# Patient Record
Sex: FEMALE | Race: WHITE | NOT HISPANIC OR LATINO | URBAN - METROPOLITAN AREA
[De-identification: names, ages, dates, MRNs, and addresses within clinical notes are randomized per-mention and may not be internally consistent; named-entity substitution may affect disease eponyms.]

---

## 2020-09-29 ENCOUNTER — CLAIMS CREATED FROM THE CLAIM WINDOW (OUTPATIENT)
Dept: URBAN - METROPOLITAN AREA CLINIC 118 | Facility: CLINIC | Age: 50
End: 2020-09-29
Payer: COMMERCIAL

## 2020-09-29 ENCOUNTER — LAB OUTSIDE AN ENCOUNTER (OUTPATIENT)
Dept: URBAN - METROPOLITAN AREA CLINIC 118 | Facility: CLINIC | Age: 50
End: 2020-09-29

## 2020-09-29 DIAGNOSIS — Z12.11 COLON CANCER SCREENING: ICD-10-CM

## 2020-09-29 DIAGNOSIS — K64.4 EXTERNAL HEMORRHOID: ICD-10-CM

## 2020-09-29 PROCEDURE — 99202 OFFICE O/P NEW SF 15 MIN: CPT | Performed by: INTERNAL MEDICINE

## 2020-09-29 PROCEDURE — G9903 PT SCRN TBCO ID AS NON USER: HCPCS | Performed by: INTERNAL MEDICINE

## 2020-09-29 PROCEDURE — 1036F TOBACCO NON-USER: CPT | Performed by: INTERNAL MEDICINE

## 2020-09-29 PROCEDURE — G8420 CALC BMI NORM PARAMETERS: HCPCS | Performed by: INTERNAL MEDICINE

## 2020-09-29 PROCEDURE — G8427 DOCREV CUR MEDS BY ELIG CLIN: HCPCS | Performed by: INTERNAL MEDICINE

## 2020-09-29 PROCEDURE — 3017F COLORECTAL CA SCREEN DOC REV: CPT | Performed by: INTERNAL MEDICINE

## 2020-09-29 RX ORDER — PHENTERMINE HYDROCHLORIDE 37.5 MG/1
CAPSULE ORAL
Qty: 0 | Refills: 0 | Status: ACTIVE | COMMUNITY
Start: 1900-01-01

## 2020-09-29 NOTE — HPI-TODAY'S VISIT:
this is a 48 yo female with h/o external hemorrhoids previously seen Dr. Goode last year.  Reports she had another flare up of hemorrhoids with rectal pain and itching in March and in the summer. Both times, she did sitz bath, topical treatments and resolved. Her stools are soft now and regular on fiber supplements.  She is maxx for colonoscopy evaluation.

## 2020-10-12 ENCOUNTER — OFFICE VISIT (OUTPATIENT)
Dept: URBAN - METROPOLITAN AREA CLINIC 118 | Facility: CLINIC | Age: 50
End: 2020-10-12

## 2020-10-21 ENCOUNTER — OFFICE VISIT (OUTPATIENT)
Dept: URBAN - METROPOLITAN AREA SURGERY CENTER 23 | Facility: SURGERY CENTER | Age: 50
End: 2020-10-21
Payer: COMMERCIAL

## 2020-10-21 DIAGNOSIS — Z12.11 COLON CANCER SCREENING: ICD-10-CM

## 2020-10-21 PROCEDURE — G9935 CANC NOT DETECTD DURING SRCN: HCPCS | Performed by: INTERNAL MEDICINE

## 2020-10-21 PROCEDURE — G8907 PT DOC NO EVENTS ON DISCHARG: HCPCS | Performed by: INTERNAL MEDICINE

## 2020-10-21 PROCEDURE — 45378 DIAGNOSTIC COLONOSCOPY: CPT | Performed by: INTERNAL MEDICINE

## 2020-10-21 RX ORDER — PHENTERMINE HYDROCHLORIDE 37.5 MG/1
CAPSULE ORAL
Qty: 0 | Refills: 0 | Status: ACTIVE | COMMUNITY
Start: 1900-01-01

## 2023-05-18 ENCOUNTER — LAB OUTSIDE AN ENCOUNTER (OUTPATIENT)
Dept: URBAN - METROPOLITAN AREA CLINIC 70 | Facility: CLINIC | Age: 53
End: 2023-05-18

## 2023-05-18 ENCOUNTER — WEB ENCOUNTER (OUTPATIENT)
Dept: URBAN - METROPOLITAN AREA CLINIC 70 | Facility: CLINIC | Age: 53
End: 2023-05-18

## 2023-05-18 ENCOUNTER — CLAIMS CREATED FROM THE CLAIM WINDOW (OUTPATIENT)
Dept: URBAN - METROPOLITAN AREA CLINIC 70 | Facility: CLINIC | Age: 53
End: 2023-05-18
Payer: COMMERCIAL

## 2023-05-18 VITALS
BODY MASS INDEX: 25.22 KG/M2 | TEMPERATURE: 97.6 F | SYSTOLIC BLOOD PRESSURE: 125 MMHG | HEART RATE: 91 BPM | WEIGHT: 151.4 LBS | HEIGHT: 65 IN | DIASTOLIC BLOOD PRESSURE: 82 MMHG

## 2023-05-18 DIAGNOSIS — R10.13 EPIGASTRIC PAIN: ICD-10-CM

## 2023-05-18 DIAGNOSIS — Z12.11 COLON CANCER SCREENING: ICD-10-CM

## 2023-05-18 DIAGNOSIS — K58.2 IRRITABLE BOWEL SYNDROME WITH ALTERNATING BOWEL HABITS: ICD-10-CM

## 2023-05-18 DIAGNOSIS — R10.84 GENERALIZED ABDOMINAL PAIN: ICD-10-CM

## 2023-05-18 PROBLEM — 440544005: Status: ACTIVE | Noted: 2023-05-18

## 2023-05-18 PROCEDURE — 99214 OFFICE O/P EST MOD 30 MIN: CPT | Performed by: NURSE PRACTITIONER

## 2023-05-18 PROCEDURE — 99214 OFFICE O/P EST MOD 30 MIN: CPT | Performed by: INTERNAL MEDICINE

## 2023-05-18 RX ORDER — OMEPRAZOLE MAGNESIUM 10 MG/1
1 PACKET MIXED WITH 15 ML OF WATER GRANULE, DELAYED RELEASE ORAL ONCE A DAY
Status: ACTIVE | COMMUNITY

## 2023-05-18 RX ORDER — PHENTERMINE HYDROCHLORIDE 37.5 MG/1
CAPSULE ORAL
Qty: 0 | Refills: 0 | Status: ACTIVE | COMMUNITY
Start: 1900-01-01

## 2023-05-18 RX ORDER — NICOTINE 14MG/24HR
AS DIRECTED PATCH, TRANSDERMAL 24 HOURS TRANSDERMAL
Status: ACTIVE | COMMUNITY

## 2023-05-18 NOTE — HPI-TODAY'S VISIT:
OV 9/29/20 Dr. Cruz: this is a 48 yo female with h/o external hemorrhoids previously seen Dr. Goode last year.  Reports she had another flare up of hemorrhoids with rectal pain and itching in March and in the summer. Both times, she did sitz bath, topical treatments and resolved. Her stools are soft now and regular on fiber supplements.  She is maxx for colonoscopy evaluation. -------------------------------------------------- Today 5/18/23: Patient presents today with c/o multiple chronic GI symptoms. She reports chronic alternating bowel habits with previously going 1-2 days without a BM and once she had a BM with hard stool she would have multipl BMs to follow with loose stool. Currently she reports having BMs x 5-6/day with loose non-bloody stool. Has associated chronic generalized abdominal discomfort. She also reports epigastric pain that is worse after eating. Takes OTC prilosec that seems to help. No NSAIDs. Uses marijuana alternative (Delta 8). No prior EGD. No alcohol use. Is s/p CCY. Mother with IBD (crohn's). Had a colonoscopy 10/21/20 with findings of hemorrhoids and tortuous colon otherwise normal. No recent antibiotics, travel, or ill contacts. Denies fever, wt loss, N/V, dysphagia, or signs of GI bleeding.

## 2023-05-19 ENCOUNTER — TELEPHONE ENCOUNTER (OUTPATIENT)
Dept: URBAN - METROPOLITAN AREA CLINIC 70 | Facility: CLINIC | Age: 53
End: 2023-05-19

## 2023-05-22 ENCOUNTER — TELEPHONE ENCOUNTER (OUTPATIENT)
Dept: URBAN - METROPOLITAN AREA CLINIC 70 | Facility: CLINIC | Age: 53
End: 2023-05-22

## 2023-05-22 RX ORDER — PANTOPRAZOLE SODIUM 40 MG/1
1 TABLET TABLET, DELAYED RELEASE ORAL ONCE A DAY
Qty: 90 TABLET | Refills: 3 | OUTPATIENT
Start: 2023-05-22

## 2023-05-25 LAB
A/G RATIO: 1.7
ALBUMIN: 4.5
ALKALINE PHOSPHATASE: 49
ALT (SGPT): 14
AST (SGOT): 18
BILIRUBIN, TOTAL: 0.6
BUN/CREATININE RATIO: (no result)
BUN: 8
C-REACTIVE PROTEIN, QUANT: 1.1
CALCIUM: 9.6
CARBON DIOXIDE, TOTAL: 25
CHLORIDE: 101
CREATININE: 0.97
EGFR: 71
GLOBULIN, TOTAL: 2.7
GLUCOSE: 64
HEMATOCRIT: 42.1
HEMOGLOBIN: 14.2
IMMUNOGLOBULIN A, QN, SERUM: 166
INTERPRETATION: (no result)
LIPASE: 20
MCH: 29.6
MCHC: 33.7
MCV: 87.7
MPV: 9.5
PLATELET COUNT: 417
POTASSIUM: 4.3
PROTEIN, TOTAL: 7.2
RDW: 12.1
RED BLOOD CELL COUNT: 4.8
SED RATE BY MODIFIED: 11
SODIUM: 138
T-TRANSGLUTAMINASE (TTG) IGA: <1
TSH W/REFLEX TO FT4: 2.7
WHITE BLOOD CELL COUNT: 5.7

## 2023-06-02 ENCOUNTER — OFFICE VISIT (OUTPATIENT)
Dept: URBAN - METROPOLITAN AREA SURGERY CENTER 24 | Facility: SURGERY CENTER | Age: 53
End: 2023-06-02
Payer: COMMERCIAL

## 2023-06-02 ENCOUNTER — CLAIMS CREATED FROM THE CLAIM WINDOW (OUTPATIENT)
Dept: URBAN - METROPOLITAN AREA CLINIC 4 | Facility: CLINIC | Age: 53
End: 2023-06-02
Payer: COMMERCIAL

## 2023-06-02 DIAGNOSIS — K21.9 GASTRO-ESOPHAGEAL REFLUX DISEASE WITHOUT ESOPHAGITIS: ICD-10-CM

## 2023-06-02 DIAGNOSIS — K21.9 ACID REFLUX: ICD-10-CM

## 2023-06-02 PROCEDURE — 43239 EGD BIOPSY SINGLE/MULTIPLE: CPT | Performed by: INTERNAL MEDICINE

## 2023-06-02 PROCEDURE — 88305 TISSUE EXAM BY PATHOLOGIST: CPT | Performed by: PATHOLOGY

## 2023-06-02 PROCEDURE — G8907 PT DOC NO EVENTS ON DISCHARG: HCPCS | Performed by: INTERNAL MEDICINE

## 2023-06-02 RX ORDER — PANTOPRAZOLE SODIUM 40 MG/1
1 TABLET TABLET, DELAYED RELEASE ORAL ONCE A DAY
Qty: 90 TABLET | Refills: 3 | Status: ACTIVE | COMMUNITY
Start: 2023-05-22

## 2023-06-02 RX ORDER — PHENTERMINE HYDROCHLORIDE 37.5 MG/1
CAPSULE ORAL
Qty: 0 | Refills: 0 | Status: ACTIVE | COMMUNITY
Start: 1900-01-01

## 2023-06-02 RX ORDER — NICOTINE 14MG/24HR
AS DIRECTED PATCH, TRANSDERMAL 24 HOURS TRANSDERMAL
Status: ACTIVE | COMMUNITY

## 2023-06-02 RX ORDER — OMEPRAZOLE MAGNESIUM 10 MG/1
1 PACKET MIXED WITH 15 ML OF WATER GRANULE, DELAYED RELEASE ORAL ONCE A DAY
Status: ACTIVE | COMMUNITY

## 2023-06-07 LAB
CAMPYLOBACTER SPP. AG,EIA: (no result)
OVA AND PARASITES, CONC AND PERM SMEAR: (no result)
PANCREATIC ELASTASE, FECAL: >500
SALMONELLA AND SHIGELLA, CULTURE: (no result)
SHIGA TOXINS, EIA W/RFL TO E.COLI O157 CULTURE: (no result)

## 2023-07-07 ENCOUNTER — OFFICE VISIT (OUTPATIENT)
Dept: URBAN - METROPOLITAN AREA CLINIC 70 | Facility: CLINIC | Age: 53
End: 2023-07-07
Payer: COMMERCIAL

## 2023-07-07 ENCOUNTER — DASHBOARD ENCOUNTERS (OUTPATIENT)
Age: 53
End: 2023-07-07

## 2023-07-07 VITALS
WEIGHT: 161.8 LBS | SYSTOLIC BLOOD PRESSURE: 116 MMHG | HEIGHT: 65 IN | HEART RATE: 84 BPM | BODY MASS INDEX: 26.96 KG/M2 | TEMPERATURE: 98 F | DIASTOLIC BLOOD PRESSURE: 75 MMHG

## 2023-07-07 DIAGNOSIS — R10.84 GENERALIZED ABDOMINAL PAIN: ICD-10-CM

## 2023-07-07 DIAGNOSIS — Z12.11 COLON CANCER SCREENING: ICD-10-CM

## 2023-07-07 DIAGNOSIS — K58.2 IRRITABLE BOWEL SYNDROME WITH ALTERNATING BOWEL HABITS: ICD-10-CM

## 2023-07-07 DIAGNOSIS — R10.13 EPIGASTRIC PAIN: ICD-10-CM

## 2023-07-07 PROCEDURE — 99214 OFFICE O/P EST MOD 30 MIN: CPT | Performed by: NURSE PRACTITIONER

## 2023-07-07 RX ORDER — NICOTINE 14MG/24HR
AS DIRECTED PATCH, TRANSDERMAL 24 HOURS TRANSDERMAL
Status: ACTIVE | COMMUNITY

## 2023-07-07 RX ORDER — PANTOPRAZOLE SODIUM 40 MG/1
1 TABLET TABLET, DELAYED RELEASE ORAL ONCE A DAY
Qty: 90 TABLET | Refills: 3 | Status: ACTIVE | COMMUNITY
Start: 2023-05-22

## 2023-07-07 RX ORDER — PHENTERMINE HYDROCHLORIDE 37.5 MG/1
CAPSULE ORAL
Qty: 0 | Refills: 0 | Status: ON HOLD | COMMUNITY
Start: 1900-01-01

## 2023-07-07 RX ORDER — DICYCLOMINE HYDROCHLORIDE 20 MG/1
1 TABLET TABLET ORAL
Qty: 30 | Refills: 2 | OUTPATIENT
Start: 2023-07-07 | End: 2023-08-06

## 2023-07-07 RX ORDER — OMEPRAZOLE MAGNESIUM 10 MG/1
1 PACKET MIXED WITH 15 ML OF WATER GRANULE, DELAYED RELEASE ORAL ONCE A DAY
Status: ON HOLD | COMMUNITY

## 2023-07-07 RX ORDER — PANTOPRAZOLE SODIUM 40 MG/1
1 TABLET TABLET, DELAYED RELEASE ORAL ONCE A DAY
OUTPATIENT
Start: 2023-05-22

## 2023-07-07 NOTE — HPI-TODAY'S VISIT:
OV 9/29/20 Dr. Cruz: this is a 50 yo female with h/o external hemorrhoids previously seen Dr. Goode last year.  Reports she had another flare up of hemorrhoids with rectal pain and itching in March and in the summer. Both times, she did sitz bath, topical treatments and resolved. Her stools are soft now and regular on fiber supplements.  She is maxx for colonoscopy evaluation. -------------------------------------------------- OV 5/18/23: Patient presents today with c/o multiple chronic GI symptoms. She reports chronic alternating bowel habits with previously going 1-2 days without a BM and once she had a BM with hard stool she would have multipl BMs to follow with loose stool. Currently she reports having BMs x 5-6/day with loose non-bloody stool. Has associated chronic generalized abdominal discomfort. She also reports epigastric pain that is worse after eating. Takes OTC prilosec that seems to help. No NSAIDs. Uses marijuana alternative (Delta 8). No prior EGD. No alcohol use. Is s/p CCY. Mother with IBD (crohn's). Had a colonoscopy 10/21/20 with findings of hemorrhoids and tortuous colon otherwise normal. No recent antibiotics, travel, or ill contacts. Denies fever, wt loss, N/V, dysphagia, or signs of GI bleeding. --------------------------------------------------- Today 7/7/23: Patient presents today for follow up. Labs/stool studies 05/2023 negative for acute process or cause of diarrhea. Abdominal CT 5/26/23 showed no acute process. Underwent EGD on 6/2/23 with findings of irregular z-line (bx +reflux, neg Barretts). Results discussed with patient with all questions answerd. She reports doing better. Prior epigastric pain is improved with daily PPI. Still has some occasional cramping but bowel habits have improved with taking daily fiber supplement. No new GI complaints or concerns.

## 2024-06-25 ENCOUNTER — ERX REFILL RESPONSE (OUTPATIENT)
Dept: URBAN - METROPOLITAN AREA CLINIC 70 | Facility: CLINIC | Age: 54
End: 2024-06-25

## 2024-06-25 RX ORDER — PANTOPRAZOLE SODIUM 40 MG/1
1 TABLET TABLET, DELAYED RELEASE ORAL ONCE A DAY
OUTPATIENT

## 2024-06-25 RX ORDER — PANTOPRAZOLE 40 MG/1
TAKE ONE TABLET BY MOUTH EVERY DAY TABLET, DELAYED RELEASE ORAL
Qty: 90 TABLET | Refills: 0 | OUTPATIENT

## 2024-07-25 ENCOUNTER — ERX REFILL RESPONSE (OUTPATIENT)
Dept: URBAN - METROPOLITAN AREA CLINIC 70 | Facility: CLINIC | Age: 54
End: 2024-07-25

## 2024-07-25 RX ORDER — DICYCLOMINE HYDROCHLORIDE 20 MG/1
TAKE ONE TABLET BY MOUTH THREE TIMES DAILY AS NEEDED FOR 10 DAYS TABLET ORAL
Qty: 30 TABLET | Refills: 3 | OUTPATIENT

## 2024-07-25 RX ORDER — DICYCLOMINE HYDROCHLORIDE 20 MG/1
1 TABLET TABLET ORAL
Qty: 90 | Refills: 1 | OUTPATIENT

## 2024-08-26 ENCOUNTER — OFFICE VISIT (OUTPATIENT)
Dept: URBAN - METROPOLITAN AREA CLINIC 70 | Facility: CLINIC | Age: 54
End: 2024-08-26
Payer: COMMERCIAL

## 2024-08-26 VITALS
HEART RATE: 68 BPM | BODY MASS INDEX: 30.69 KG/M2 | SYSTOLIC BLOOD PRESSURE: 134 MMHG | DIASTOLIC BLOOD PRESSURE: 83 MMHG | HEIGHT: 65 IN | WEIGHT: 184.2 LBS | TEMPERATURE: 98.1 F

## 2024-08-26 DIAGNOSIS — Z12.11 COLON CANCER SCREENING: ICD-10-CM

## 2024-08-26 DIAGNOSIS — R10.84 GENERALIZED ABDOMINAL PAIN: ICD-10-CM

## 2024-08-26 DIAGNOSIS — K58.2 IRRITABLE BOWEL SYNDROME WITH ALTERNATING BOWEL HABITS: ICD-10-CM

## 2024-08-26 PROCEDURE — 99214 OFFICE O/P EST MOD 30 MIN: CPT | Performed by: NURSE PRACTITIONER

## 2024-08-26 RX ORDER — PANTOPRAZOLE SODIUM 40 MG/1
1 TABLET TABLET, DELAYED RELEASE ORAL ONCE A DAY
Qty: 90 TABLET | Refills: 3

## 2024-08-26 RX ORDER — NICOTINE 14MG/24HR
AS DIRECTED PATCH, TRANSDERMAL 24 HOURS TRANSDERMAL
Status: ACTIVE | COMMUNITY

## 2024-08-26 RX ORDER — DICYCLOMINE HYDROCHLORIDE 20 MG/1
1 TABLET TABLET ORAL
Qty: 90 | Refills: 1
End: 2024-10-25

## 2024-08-26 RX ORDER — DICYCLOMINE HYDROCHLORIDE 20 MG/1
1 TABLET TABLET ORAL
Qty: 90 | Refills: 1 | Status: ACTIVE | COMMUNITY

## 2024-08-26 RX ORDER — ERGOCALCIFEROL 1.25 MG/1
TAKE 1 CAPSULE BY MOUTH TWICE A WEEK AS DIRECTED CAPSULE, LIQUID FILLED ORAL
Qty: 8 EACH | Refills: 4 | Status: ACTIVE | COMMUNITY

## 2024-08-26 RX ORDER — OMEPRAZOLE MAGNESIUM 10 MG/1
1 PACKET MIXED WITH 15 ML OF WATER GRANULE, DELAYED RELEASE ORAL ONCE A DAY
Status: DISCONTINUED | COMMUNITY

## 2024-08-26 RX ORDER — PANTOPRAZOLE 40 MG/1
TAKE ONE TABLET BY MOUTH EVERY DAY TABLET, DELAYED RELEASE ORAL
Qty: 90 TABLET | Refills: 0 | Status: ACTIVE | COMMUNITY

## 2024-08-26 RX ORDER — KETOCONAZOLE 20 MG/ML
LATHER, THEN LET SIT 5 MINUTES, THEN RINSE 3 TO 4 TIMES A WEEK SHAMPOO, SUSPENSION TOPICAL
Qty: 120 MILLILITER | Refills: 0 | Status: ACTIVE | COMMUNITY

## 2024-08-26 RX ORDER — CYCLOSPORINE 0.5 MG/ML
INSTILL ONE DROP INTO AFFECTED EYE EVERY TWELVE HOURS AS DIRECTED EMULSION OPHTHALMIC
Qty: 60 EACH | Refills: 1 | Status: ACTIVE | COMMUNITY

## 2024-08-26 RX ORDER — PHENTERMINE HYDROCHLORIDE 37.5 MG/1
CAPSULE ORAL
Qty: 0 | Refills: 0 | Status: DISCONTINUED | COMMUNITY

## 2024-08-26 NOTE — HPI-TODAY'S VISIT:
OV 9/29/20 Dr. Cruz: this is a 48 yo female with h/o external hemorrhoids previously seen Dr. Goode last year.  Reports she had another flare up of hemorrhoids with rectal pain and itching in March and in the summer. Both times, she did sitz bath, topical treatments and resolved. Her stools are soft now and regular on fiber supplements.  She is maxx for colonoscopy evaluation. - - - - - - - - - - - - - - - - - - - - - - - - -- OV 5/18/23: Patient presents today with c/o multiple chronic GI symptoms. She reports chronic alternating bowel habits with previously going 1-2 days without a BM and once she had a BM with hard stool she would have multipl BMs to follow with loose stool. Currently she reports having BMs x 5-6/day with loose non-bloody stool. Has associated chronic generalized abdominal discomfort. She also reports epigastric pain that is worse after eating. Takes OTC prilosec that seems to help. No NSAIDs. Uses marijuana alternative (Delta 8). No prior EGD. No alcohol use. Is s/p CCY. Mother with IBD (crohn's). Had a colonoscopy 10/21/20 with findings of hemorrhoids and tortuous colon otherwise normal. No recent antibiotics, travel, or ill contacts. Denies fever, wt loss, N/V, dysphagia, or signs of GI bleeding. - - - - - - - - - - - - - - - - - - - - - - - - - - OV 7/7/23: Patient presents today for follow up. Labs/stool studies 05/2023 negative for acute process or cause of diarrhea. Abdominal CT 5/26/23 showed no acute process. Underwent EGD on 6/2/23 with findings of irregular z-line (bx +reflux, neg Barretts). Results discussed with patient with all questions answerd. She reports doing better. Prior epigastric pain is improved with daily PPI. Still has some occasional cramping but bowel habits have improved with taking daily fiber supplement. No new GI complaints or concerns. - - - - - - - - - - - - - - - -  Today 8/26/24: Patient presents today for annual visit. She reports doing pretty well. GERD is controlled on PPI. Bowel habits are stable on dicyclomine and probiotic. Has had more foul smelling stool but no increase if bowel frequency or associated symptoms.  She states she was placed on a new medicaton by rhuematologist with labs in July showing ALT 33. Instructed pt to f/u if repeat labs show persistent elevated for further workup with understanding voiced. She also states her GYN told her she believes she has adhesions due to intermittent abdominal pain with movement. She discussed option of exp lap with lysis but declined.

## 2025-01-02 ENCOUNTER — ERX REFILL RESPONSE (OUTPATIENT)
Dept: URBAN - METROPOLITAN AREA CLINIC 70 | Facility: CLINIC | Age: 55
End: 2025-01-02

## 2025-01-02 RX ORDER — DICYCLOMINE HYDROCHLORIDE 20 MG/1
1 TABLET TABLET ORAL THREE TIMES A DAY
Qty: 90 | Refills: 1 | OUTPATIENT

## 2025-01-02 RX ORDER — DICYCLOMINE HYDROCHLORIDE 20 MG/1
TAKE 1 TABLET BY MOUTH THREE TIMES DAILY AS NEEDED TABLET ORAL
Qty: 90 TABLET | Refills: 2 | OUTPATIENT